# Patient Record
Sex: FEMALE | Race: WHITE | Employment: FULL TIME | URBAN - METROPOLITAN AREA
[De-identification: names, ages, dates, MRNs, and addresses within clinical notes are randomized per-mention and may not be internally consistent; named-entity substitution may affect disease eponyms.]

---

## 2020-04-10 ENCOUNTER — TELEPHONE (OUTPATIENT)
Dept: OTOLARYNGOLOGY | Facility: CLINIC | Age: 55
End: 2020-04-10

## 2020-04-10 NOTE — TELEPHONE ENCOUNTER
Returned call to pt in regards to message below. Pt has been taking flonase daily trying different dosing (spray amounts)- to see what little she can get by as far as not having s/s of neck pain and ear fullness. Her PMD would like her to see Dr Huerta as the PMD states these s/s shouldn't reoccur when she stops the nasal spray.   pt then had a nose bleed that lasted for about an hour. Her PMD is having trouble getting her referral to see us. I gave her the ent fax number so that I may check fax machine.    Jeaneth Villela RN Specialty Triage 4/10/2020 4:07 PM

## 2020-04-10 NOTE — TELEPHONE ENCOUNTER
Reason for Call:  Other Referral    Detailed comments: Patient calling, she would like to speak with Dr. Huerta and figure out why her referral isn't going through. Please call back to advise.    Phone Number Patient can be reached at: Home number on file [phone] (home)    Best Time: any    Can we leave a detailed message on this number? YES    Call taken on 4/10/2020 at 3:40 PM by Tab Davies

## 2020-04-13 NOTE — TELEPHONE ENCOUNTER
Checked both fax machines- no referral found. Will check again later today. When I spoke to pt it was late and her PMD clinic may have been closed, therefore not able to fax to us.    Jeaneth Villela RN Specialty Triage 4/13/2020 9:09 AM

## 2020-04-13 NOTE — TELEPHONE ENCOUNTER
MA staff handled and helped pt scheduled.     Jeaneth Villela RN Specialty Triage 4/13/2020 1:23 PM

## 2020-04-20 ENCOUNTER — OFFICE VISIT (OUTPATIENT)
Dept: OTOLARYNGOLOGY | Facility: CLINIC | Age: 55
End: 2020-04-20
Payer: COMMERCIAL

## 2020-04-20 ENCOUNTER — OFFICE VISIT (OUTPATIENT)
Dept: AUDIOLOGY | Facility: CLINIC | Age: 55
End: 2020-04-20
Payer: COMMERCIAL

## 2020-04-20 VITALS — WEIGHT: 283 LBS | HEIGHT: 66 IN | BODY MASS INDEX: 45.48 KG/M2 | RESPIRATION RATE: 16 BRPM

## 2020-04-20 DIAGNOSIS — M54.2 NECK PAIN: ICD-10-CM

## 2020-04-20 DIAGNOSIS — Z53.9 ERRONEOUS ENCOUNTER--DISREGARD: Primary | ICD-10-CM

## 2020-04-20 DIAGNOSIS — R59.0 CERVICAL LYMPHADENOPATHY: Primary | ICD-10-CM

## 2020-04-20 PROCEDURE — 99204 OFFICE O/P NEW MOD 45 MIN: CPT | Performed by: OTOLARYNGOLOGY

## 2020-04-20 ASSESSMENT — PAIN SCALES - GENERAL: PAINLEVEL: NO PAIN (0)

## 2020-04-20 ASSESSMENT — MIFFLIN-ST. JEOR: SCORE: 1892.49

## 2020-04-20 NOTE — PATIENT INSTRUCTIONS
Scheduling Information  To schedule your CT/MRI scan, please contact [facility] at [phone] OR [facility] at [phone]    To schedule your Surgery, please contact our Specialty Schedulers at [phone]      ENT Clinic Locations Clinic Hours Telephone Number     [facility]  [address] [place] [address]   Monday:           1:00pm -- 5:00pm    Friday:              8:00am - 12:00pm   To schedule/reschedule an appointment with   Dr. Huerta,   please contact our   Specialty Scheduling Department at:     [phone]       [facility]  [address] [place] [address] Tuesday:          8:00am -- 2:00pm         Urgent Care Locations Clinic Hours Telephone Numbers     [facility]  [address] [place] [address]     Monday-Friday:     11:00am - 9:00pm    Saturday-Sunday:  9:00am - 5:00pm   [phone]     [facility]  [address] [place] [address]     Monday-Friday:      5:00pm - 9:00pm     Saturday-Sunday:  9:00am - 5:00pm   [phone]

## 2020-04-20 NOTE — LETTER
4/20/2020         RE: Meaghan Feldman  [address]  [place] [facility] [address]        Dear Colleague,    Thank you for referring your patient, Meaghan Feldman, to the [facility]. Please see a copy of my visit note below.    History of Present Illness - Meaghan Feldman is a 55 year old female is a patient last seen by me over 3 years ago, in 2012.  At that time her chief complaint was that of ear fullness for 6 months, RIGHT more than LEFT.  She had no history of ear disease at all.  Audiologic exam at that time was normal, no evidence of sensorineural hearing loss, no asymmetry in hearing, and normal eustachian tube function.  I felt that it might be temporomandibular disease.  She was lost to follow up until now.    She has come back to see me for a totally different reason.  She has had issues with her neck.  She tells me that about two years ago she had a very bad URI and severe cough due to her asthma.  She started having episodes where she would wake up with pain under and along her jaw.  After a few weeks went by the pain cleared up as well as her cough.  Then a similar episode of pain happened in the jaw in February of 2019.  The second time however there was no prodromal illness or any fevers at all.  It happened again in May and July of 2019.     She has continuously used flonase since Thanksgiving due to suspicion of eustachian tube dysfunction.  She started getting nosebleeds from the flonase.  She does think that the flonase helps.    Past Medical History - There is no problem list on file for this patient.      Current Medications -   Current Outpatient Medications:      ALBUTEROL IN, Inhale  into the lungs., Disp: , Rfl:      buPROPion (WELLBUTRIN XL) 300 MG 24 hr tablet, Take  by mouth every morning., Disp: , Rfl:      FLUoxetine (PROZAC) 40 MG capsule, Take  by mouth daily., Disp: , Rfl:      fluticasone-salmeterol (ADVAIR DISKUS) 250-50 MCG/DOSE diskus inhaler, Inhale 1 puff into  the lungs every 12 hours., Disp: , Rfl:     Allergies -   Allergies   Allergen Reactions     No Known Drug Allergies        Social History -   Social History     Socioeconomic History     Marital status: Unknown     Spouse name: Not on file     Number of children: Not on file     Years of education: Not on file     Highest education level: Not on file   Occupational History     Not on file   Social Needs     Financial resource strain: Not on file     Food insecurity     Worry: Not on file     Inability: Not on file     Transportation needs     Medical: Not on file     Non-medical: Not on file   Tobacco Use     Smoking status: Never Smoker     Smokeless tobacco: Never Used   Substance and Sexual Activity     Alcohol use: Yes     Comment: rare     Drug use: No     Sexual activity: Not Currently     Partners: Male     Birth control/protection: Pill     Comment: orthocyclen   Lifestyle     Physical activity     Days per week: Not on file     Minutes per session: Not on file     Stress: Not on file   Relationships     Social connections     Talks on phone: Not on file     Gets together: Not on file     Attends [employer] service: Not on file     Active member of club or organization: Not on file     Attends meetings of clubs or organizations: Not on file     Relationship status: Not on file     Intimate partner violence     Fear of current or ex partner: Not on file     Emotionally abused: Not on file     Physically abused: Not on file     Forced sexual activity: Not on file   Other Topics Concern     Not on file   Social History Narrative    Caffeine intake/servings daily - 1    Calcium intake/servings daily - 3 plus supplement    Exercise 3 times weekly - describe walking    Sunscreen used - Yes    Seatbelts used - Yes    Guns stored in the home - No    Self Breast Exam - Yes    Pap test up to date -  Yes    Eye exam up to date -  Yes    Dental exam up to date -  Yes    DEXA scan up to date -  Not Applicable    Flex  "Sig/Colonoscopy up to date -  Not Applicable    Mammography up to date -  Not Applicable    Immunizations reviewed and up to date - Yes    Abuse: Current or Past (Physical, Sexual or Emotional) - No    Do you feel safe in your environment - Yes    Do you cope well with stress - Yes and No    Do you suffer from insomnia - Yes    Last updated by: Carina Mittal  2/8/2006           Family History -   Family History   Problem Relation Age of Onset     Heart Disease Father         bypass     Diabetes Maternal Uncle      Diabetes Maternal Aunt      Diabetes Maternal Aunt      Cancer Father         larnyx     Gynecology Mother         hyst done 1 year ago     Cancer Maternal Uncle        Review of Systems - As per HPI and PMHx, otherwise 10+ system review of the head and neck, and general constitution is negative.    Physical Exam  Resp 16   Ht 1.664 m (5' 5.5\")   Wt 128.4 kg (283 lb)   BMI 46.38 kg/m      General - The patient is well nourished and well developed, and appears to have good nutritional status.  Alert and oriented to person and place, answers questions and cooperates with examination appropriately.   Head and Face - Normocephalic and atraumatic, with no gross asymmetry noted of the contour of the facial features.  The facial nerve is intact, with strong symmetric movements.  Voice and Breathing - The patient was breathing comfortably without the use of accessory muscles. There was no wheezing, stridor, or stertor.  The patients voice was clear and strong, and had appropriate pitch and quality.  Ears - The tympanic membranes are normal in appearance, bony landmarks are intact.  No retraction, perforation, or masses.  No fluid or purulence was seen in the external canal or the middle ear. No evidence of infection of the middle ear or external canal, cerumen was normal in appearance.  Eyes - Extraocular movements intact, and the pupils were reactive to light.  Sclera were not icteric or injected, conjunctiva " were pink and moist.  Mouth - Examination of the oral cavity showed pink, healthy oral mucosa. No lesions or ulcerations noted.  The tongue was mobile and midline, and the dentition were in good condition.    Throat - The walls of the oropharynx were smooth, pink, moist, symmetric, and had no lesions or ulcerations.  The tonsillar pillars and soft palate were symmetric.  The uvula was midline on elevation.    Neck - Normal midline excursion of the laryngotracheal complex during swallowing.  Full range of motion on passive movement.  Palpation of the occipital, submental, submandibular, internal jugular chain, and supraclavicular nodes did not demonstrate any abnormal lymph nodes or masses.  The carotid pulse was palpable bilaterally.  Palpation of the thyroid was soft and smooth, with no nodules or goiter appreciated.  The trachea was mobile and midline.  Nose - External contour is symmetric, no gross deflection or scars.  Nasal mucosa is pink and moist with no abnormal mucus.  The septum was midline and non-obstructive, turbinates of normal size and position.  No polyps, masses, or purulence noted on examination.      A/P - Meaghan Feldman is a 55 year old female  (R59.0) Cervical lymphadenopathy  (primary encounter diagnosis)  (M54.2) Neck pain    I do not  any overt signs of pathologic lymphadenopathy, or evidence of a mass in the aerodigestive tract.  Based on the history I would suspect that these were actually flare ups of temporomandibular disease.  But also carotidynia.    To make sure, I will order a neck CT with contrast and call the patient with results.    Again, thank you for allowing me to participate in the care of your patient.        Sincerely,        Cesar Huerta MD

## 2020-04-20 NOTE — PROGRESS NOTES
History of Present Illness - Meaghan Feldman is a 55 year old female is a patient last seen by me over 3 years ago, in 2012.  At that time her chief complaint was that of ear fullness for 6 months, RIGHT more than LEFT.  She had no history of ear disease at all.  Audiologic exam at that time was normal, no evidence of sensorineural hearing loss, no asymmetry in hearing, and normal eustachian tube function.  I felt that it might be temporomandibular disease.  She was lost to follow up until now.    She has come back to see me for a totally different reason.  She has had issues with her neck.  She tells me that about two years ago she had a very bad URI and severe cough due to her asthma.  She started having episodes where she would wake up with pain under and along her jaw.  After a few weeks went by the pain cleared up as well as her cough.  Then a similar episode of pain happened in the jaw in February of 2019.  The second time however there was no prodromal illness or any fevers at all.  It happened again in May and July of 2019.     She has continuously used flonase since Thanksgiving due to suspicion of eustachian tube dysfunction.  She started getting nosebleeds from the flonase.  She does think that the flonase helps.    Past Medical History - There is no problem list on file for this patient.      Current Medications -   Current Outpatient Medications:      ALBUTEROL IN, Inhale  into the lungs., Disp: , Rfl:      buPROPion (WELLBUTRIN XL) 300 MG 24 hr tablet, Take  by mouth every morning., Disp: , Rfl:      FLUoxetine (PROZAC) 40 MG capsule, Take  by mouth daily., Disp: , Rfl:      fluticasone-salmeterol (ADVAIR DISKUS) 250-50 MCG/DOSE diskus inhaler, Inhale 1 puff into the lungs every 12 hours., Disp: , Rfl:     Allergies -   Allergies   Allergen Reactions     No Known Drug Allergies        Social History -   Social History     Socioeconomic History     Marital status: Unknown     Spouse name: Not on file      Number of children: Not on file     Years of education: Not on file     Highest education level: Not on file   Occupational History     Not on file   Social Needs     Financial resource strain: Not on file     Food insecurity     Worry: Not on file     Inability: Not on file     Transportation needs     Medical: Not on file     Non-medical: Not on file   Tobacco Use     Smoking status: Never Smoker     Smokeless tobacco: Never Used   Substance and Sexual Activity     Alcohol use: Yes     Comment: rare     Drug use: No     Sexual activity: Not Currently     Partners: Male     Birth control/protection: Pill     Comment: orthocyclen   Lifestyle     Physical activity     Days per week: Not on file     Minutes per session: Not on file     Stress: Not on file   Relationships     Social connections     Talks on phone: Not on file     Gets together: Not on file     Attends Jewish service: Not on file     Active member of club or organization: Not on file     Attends meetings of clubs or organizations: Not on file     Relationship status: Not on file     Intimate partner violence     Fear of current or ex partner: Not on file     Emotionally abused: Not on file     Physically abused: Not on file     Forced sexual activity: Not on file   Other Topics Concern     Not on file   Social History Narrative    Caffeine intake/servings daily - 1    Calcium intake/servings daily - 3 plus supplement    Exercise 3 times weekly - describe walking    Sunscreen used - Yes    Seatbelts used - Yes    Guns stored in the home - No    Self Breast Exam - Yes    Pap test up to date -  Yes    Eye exam up to date -  Yes    Dental exam up to date -  Yes    DEXA scan up to date -  Not Applicable    Flex Sig/Colonoscopy up to date -  Not Applicable    Mammography up to date -  Not Applicable    Immunizations reviewed and up to date - Yes    Abuse: Current or Past (Physical, Sexual or Emotional) - No    Do you feel safe in your environment - Yes     "Do you cope well with stress - Yes and No    Do you suffer from insomnia - Yes    Last updated by: Carina Mittal  2/8/2006           Family History -   Family History   Problem Relation Age of Onset     Heart Disease Father         bypass     Diabetes Maternal Uncle      Diabetes Maternal Aunt      Diabetes Maternal Aunt      Cancer Father         larnyx     Gynecology Mother         hyst done 1 year ago     Cancer Maternal Uncle        Review of Systems - As per HPI and PMHx, otherwise 10+ system review of the head and neck, and general constitution is negative.    Physical Exam  Resp 16   Ht 1.664 m (5' 5.5\")   Wt 128.4 kg (283 lb)   BMI 46.38 kg/m      General - The patient is well nourished and well developed, and appears to have good nutritional status.  Alert and oriented to person and place, answers questions and cooperates with examination appropriately.   Head and Face - Normocephalic and atraumatic, with no gross asymmetry noted of the contour of the facial features.  The facial nerve is intact, with strong symmetric movements.  Voice and Breathing - The patient was breathing comfortably without the use of accessory muscles. There was no wheezing, stridor, or stertor.  The patients voice was clear and strong, and had appropriate pitch and quality.  Ears - The tympanic membranes are normal in appearance, bony landmarks are intact.  No retraction, perforation, or masses.  No fluid or purulence was seen in the external canal or the middle ear. No evidence of infection of the middle ear or external canal, cerumen was normal in appearance.  Eyes - Extraocular movements intact, and the pupils were reactive to light.  Sclera were not icteric or injected, conjunctiva were pink and moist.  Mouth - Examination of the oral cavity showed pink, healthy oral mucosa. No lesions or ulcerations noted.  The tongue was mobile and midline, and the dentition were in good condition.    Throat - The walls of the oropharynx were " smooth, pink, moist, symmetric, and had no lesions or ulcerations.  The tonsillar pillars and soft palate were symmetric.  The uvula was midline on elevation.    Neck - Normal midline excursion of the laryngotracheal complex during swallowing.  Full range of motion on passive movement.  Palpation of the occipital, submental, submandibular, internal jugular chain, and supraclavicular nodes did not demonstrate any abnormal lymph nodes or masses.  The carotid pulse was palpable bilaterally.  Palpation of the thyroid was soft and smooth, with no nodules or goiter appreciated.  The trachea was mobile and midline.  Nose - External contour is symmetric, no gross deflection or scars.  Nasal mucosa is pink and moist with no abnormal mucus.  The septum was midline and non-obstructive, turbinates of normal size and position.  No polyps, masses, or purulence noted on examination.      A/P - Meaghan Feldman is a 55 year old female  (R59.0) Cervical lymphadenopathy  (primary encounter diagnosis)  (M54.2) Neck pain    I do not  any overt signs of pathologic lymphadenopathy, or evidence of a mass in the aerodigestive tract.  Based on the history I would suspect that these were actually flare ups of temporomandibular disease.  But also carotidynia.    To make sure, I will order a neck CT with contrast and call the patient with results.

## 2020-04-27 ENCOUNTER — ANCILLARY PROCEDURE (OUTPATIENT)
Dept: CT IMAGING | Facility: CLINIC | Age: 55
End: 2020-04-27
Attending: OTOLARYNGOLOGY
Payer: COMMERCIAL

## 2020-04-27 DIAGNOSIS — M54.2 NECK PAIN: ICD-10-CM

## 2020-04-27 DIAGNOSIS — R59.0 CERVICAL LYMPHADENOPATHY: ICD-10-CM

## 2020-04-27 PROCEDURE — 70491 CT SOFT TISSUE NECK W/DYE: CPT | Performed by: RADIOLOGY

## 2020-04-27 RX ORDER — IOPAMIDOL 755 MG/ML
100 INJECTION, SOLUTION INTRAVASCULAR ONCE
Status: COMPLETED | OUTPATIENT
Start: 2020-04-27 | End: 2020-04-27

## 2020-04-27 RX ADMIN — IOPAMIDOL 100 ML: 755 INJECTION, SOLUTION INTRAVASCULAR at 13:27

## 2020-04-28 ENCOUNTER — TELEPHONE (OUTPATIENT)
Dept: OTOLARYNGOLOGY | Facility: CLINIC | Age: 55
End: 2020-04-28

## 2020-04-28 DIAGNOSIS — R59.0 CERVICAL LYMPHADENOPATHY: Primary | ICD-10-CM

## 2020-04-28 RX ORDER — PREDNISONE 10 MG/1
10 TABLET ORAL 2 TIMES DAILY
Qty: 6 TABLET | Refills: 6 | Status: SHIPPED | OUTPATIENT
Start: 2020-04-28 | End: 2021-09-17

## 2020-04-28 NOTE — TELEPHONE ENCOUNTER
Called and spoke with patient.  The CT scan of the neck was reviewed.  No evidence of any pathology, and scattered slightly enlarged nodes, but with normal architecture noted, RIGHT more than LEFT.    I suspect that the illness that started this might have been infectious mono/CMV, which would explain the recurrent cycles of lymphadenopathy. I counseled the patient about this thought.    I will prescribe three burst of prednisone, which will help the next time this happens.    Call if there is worsening symptoms or persistent enlarged nodes.

## 2021-09-15 ENCOUNTER — TELEPHONE (OUTPATIENT)
Dept: OTOLARYNGOLOGY | Facility: CLINIC | Age: 56
End: 2021-09-15

## 2021-09-15 NOTE — TELEPHONE ENCOUNTER
Contacted pharmacy and was informed that they did not request prednisone from them.     Jeniffer UNDERWOOD RN, Specialty Clinic 09/15/21 10:11 AM

## 2021-09-17 ENCOUNTER — TELEPHONE (OUTPATIENT)
Dept: OTOLARYNGOLOGY | Facility: CLINIC | Age: 56
End: 2021-09-17

## 2021-09-17 DIAGNOSIS — R59.0 CERVICAL LYMPHADENOPATHY: ICD-10-CM

## 2021-09-17 RX ORDER — PREDNISONE 10 MG/1
10 TABLET ORAL 2 TIMES DAILY
Qty: 6 TABLET | Refills: 4 | Status: SHIPPED | OUTPATIENT
Start: 2021-09-17 | End: 2022-10-25

## 2021-09-17 NOTE — TELEPHONE ENCOUNTER
Called pt and spoke to her about the following s/s  Fever-no  Chills-no  Cough-no  Cold-no  Headache-no  sneezing-no  Ear pain-no  Plugged sensation of the ear-no  Change in hearing-no  Popping/crackling of ears-no  Ringing of the ears-no  Ear drainage-no  Sinus pain/pressure-no  Drainage from the nares or down throat-no  Nasal congestion-a little  Sensation of nasal obstruction-no  Constant clearing of the throat-no  Sore throat- not currently  Jaw pain- not currently  Pain with chewing-no  Grinding of teeth-no  Popping/catching of the jaw-no    Used last dose in April and was just calling so she may have more on hand for when it is needed    [facility] [place]

## 2021-09-17 NOTE — TELEPHONE ENCOUNTER
Reason for Call:  Medication or medication refill:    Do you use a [facility]?  [facility] the [facility] the [facility] [place]    Name of the medication requested: prednisone 10 mg    Other request: patient called pharmacy for refill. Was told to contact Dr Huerta    Can we leave a detailed message on this number? YES    Phone number patient can be reached at: Home number on file [phone] (home)    Best Time: any    Call taken on 9/17/2021 at 11:46 AM by Rolanda Amos

## 2021-11-04 ENCOUNTER — MYC MEDICAL ADVICE (OUTPATIENT)
Dept: OTOLARYNGOLOGY | Facility: CLINIC | Age: 56
End: 2021-11-04

## 2021-11-07 ENCOUNTER — HEALTH MAINTENANCE LETTER (OUTPATIENT)
Age: 56
End: 2021-11-07

## 2021-11-19 ENCOUNTER — MYC MEDICAL ADVICE (OUTPATIENT)
Dept: OTOLARYNGOLOGY | Facility: CLINIC | Age: 56
End: 2021-11-19
Payer: COMMERCIAL

## 2021-11-19 DIAGNOSIS — T75.3XXA SEA SICKNESS, INITIAL ENCOUNTER: Primary | ICD-10-CM

## 2021-11-22 RX ORDER — SCOLOPAMINE TRANSDERMAL SYSTEM 1 MG/1
1 PATCH, EXTENDED RELEASE TRANSDERMAL
Qty: 10 PATCH | Refills: 1 | Status: SHIPPED | OUTPATIENT
Start: 2021-11-22

## 2022-10-24 ENCOUNTER — MYC MEDICAL ADVICE (OUTPATIENT)
Dept: ALLERGY | Facility: CLINIC | Age: 57
End: 2022-10-24

## 2022-10-24 DIAGNOSIS — R59.0 CERVICAL LYMPHADENOPATHY: ICD-10-CM

## 2022-10-24 RX ORDER — PREDNISONE 10 MG/1
10 TABLET ORAL 2 TIMES DAILY
Qty: 6 TABLET | Refills: 4 | Status: CANCELLED | OUTPATIENT
Start: 2022-10-24

## 2022-10-25 RX ORDER — PREDNISONE 10 MG/1
10 TABLET ORAL 2 TIMES DAILY
Qty: 6 TABLET | Refills: 4 | Status: SHIPPED | OUTPATIENT
Start: 2022-10-25 | End: 2024-05-10

## 2022-10-25 NOTE — TELEPHONE ENCOUNTER
Medication refilled by Dr. Huerta today. This request is complete    Asuncion HARTMANN RN Specialty Triage 10/25/2022 10:23 AM

## 2022-11-19 ENCOUNTER — HEALTH MAINTENANCE LETTER (OUTPATIENT)
Age: 57
End: 2022-11-19

## 2023-11-19 ENCOUNTER — HEALTH MAINTENANCE LETTER (OUTPATIENT)
Age: 58
End: 2023-11-19

## 2024-01-27 ENCOUNTER — HEALTH MAINTENANCE LETTER (OUTPATIENT)
Age: 59
End: 2024-01-27

## 2024-05-07 DIAGNOSIS — R59.0 CERVICAL LYMPHADENOPATHY: ICD-10-CM

## 2024-05-07 RX ORDER — PREDNISONE 10 MG/1
10 TABLET ORAL 2 TIMES DAILY
Qty: 6 TABLET | Refills: 4 | OUTPATIENT
Start: 2024-05-07

## 2024-05-07 NOTE — TELEPHONE ENCOUNTER
Pending Prescriptions:                       Disp   Refills    predniSONE (DELTASONE) 10 MG tablet       6 tabl*4            Sig: Take 1 tablet (10 mg) by mouth 2 times daily

## 2024-05-07 NOTE — TELEPHONE ENCOUNTER
Refused Prescriptions:                       Disp   Refills    predniSONE (DELTASONE) 10 MG tablet        6 tabl*4        Sig: Take 1 tablet (10 mg) by mouth 2 times daily  Refused By: ASUNCION YATES  Reason for Refusal: Patient needs appointment    Patient hasn't been seen since 2020.    Asuncion Yates RN Care Coordinator, ENT Specialty Clinic 05/07/24 11:19 AM

## 2024-05-10 DIAGNOSIS — R59.0 CERVICAL LYMPHADENOPATHY: ICD-10-CM

## 2024-05-10 RX ORDER — PREDNISONE 10 MG/1
10 TABLET ORAL 2 TIMES DAILY
Qty: 6 TABLET | Refills: 4 | Status: SHIPPED | OUTPATIENT
Start: 2024-05-10

## 2024-05-10 NOTE — TELEPHONE ENCOUNTER
Prednisone 10mg tab       Last Written Prescription Date:  10-25-22  Last Fill Quantity: 6,   # refills: 4  Last Office Visit: 04-20-20  Future Office visit:     NONE  Routing refill request to provider for review/approval because:  Medication is reported/historical  Pending Prescriptions:                       Disp   Refills    predniSONE (DELTASONE) 10 MG tablet       6 tabl*4            Sig: Take 1 tablet (10 mg) by mouth 2 times daily    April Timbo, [facility] 5/10/2024 10:15 AM

## 2024-05-10 NOTE — TELEPHONE ENCOUNTER
Requested Prescriptions   Pending Prescriptions Disp Refills    predniSONE (DELTASONE) 10 MG tablet 6 tablet 4     Sig: Take 1 tablet (10 mg) by mouth 2 times daily       There is no refill protocol information for this order        Routing refill request to provider for review/approval because:  Drug not on the [facility] refill protocol     Asuncion HARTMANN, Specialty RN 5/10/2024 10:46 AM

## 2025-06-09 DIAGNOSIS — R59.0 CERVICAL LYMPHADENOPATHY: ICD-10-CM

## 2025-06-09 RX ORDER — PREDNISONE 10 MG/1
10 TABLET ORAL 2 TIMES DAILY
Qty: 6 TABLET | Refills: 4 | Status: SHIPPED | OUTPATIENT
Start: 2025-06-09

## 2025-06-09 NOTE — TELEPHONE ENCOUNTER
Pending Prescriptions:                       Disp   Refills    predniSONE (DELTASONE) 10 MG tablet       6 tabl*4            Sig: Take 1 tablet (10 mg) by mouth 2 times daily.    Routing refill request to provider for review/approval because:     LOV 2020    Asuncion Yates RN Care Coordinator, ENT Specialty Clinic 06/09/25 11:08 AM